# Patient Record
Sex: MALE
[De-identification: names, ages, dates, MRNs, and addresses within clinical notes are randomized per-mention and may not be internally consistent; named-entity substitution may affect disease eponyms.]

---

## 2023-05-23 ENCOUNTER — APPOINTMENT (OUTPATIENT)
Facility: HOSPITAL | Age: 23
End: 2023-05-23
Payer: MEDICAID

## 2023-05-23 ENCOUNTER — HOSPITAL ENCOUNTER (EMERGENCY)
Facility: HOSPITAL | Age: 23
Discharge: HOME | End: 2023-05-23
Attending: STUDENT IN AN ORGANIZED HEALTH CARE EDUCATION/TRAINING PROGRAM
Payer: MEDICAID

## 2023-05-23 VITALS
HEIGHT: 68 IN | SYSTOLIC BLOOD PRESSURE: 134 MMHG | DIASTOLIC BLOOD PRESSURE: 108 MMHG | TEMPERATURE: 98.6 F | HEART RATE: 65 BPM | RESPIRATION RATE: 18 BRPM | WEIGHT: 142.42 LBS | OXYGEN SATURATION: 98 % | BODY MASS INDEX: 21.58 KG/M2

## 2023-05-23 DIAGNOSIS — S06.0XAA CONCUSSION WITH UNKNOWN LOSS OF CONSCIOUSNESS STATUS, INITIAL ENCOUNTER: ICD-10-CM

## 2023-05-23 DIAGNOSIS — S09.90XA INJURY OF HEAD, INITIAL ENCOUNTER: ICD-10-CM
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
## 2023-05-23 PROCEDURE — 99284 EMERGENCY DEPT VISIT MOD MDM: CPT

## 2023-05-23 PROCEDURE — 70480 CT ORBIT/EAR/FOSSA W/O DYE: CPT

## 2023-05-23 PROCEDURE — 70450 CT HEAD/BRAIN W/O DYE: CPT

## 2023-05-23 PROCEDURE — 72125 CT NECK SPINE W/O DYE: CPT

## 2023-05-23 ASSESSMENT — PAIN DESCRIPTION - LOCATION: LOCATION: EYE

## 2023-05-23 ASSESSMENT — PAIN SCALES - GENERAL: PAINLEVEL_OUTOF10: 5

## 2023-05-24 NOTE — ED NOTES
at bedside. Holding for Bloominous.       Juanita Pedro [facility]  05/23/23 6404
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
## 2023-05-24 NOTE — ED NOTES
Discharge info and education provided to Patient with  at bedside. All questions answered.       [facility]  05/23/23 0695

## 2023-05-24 NOTE — DISCHARGE INSTRUCTIONS
Please begin icing the area around your eye, you can consider taking 2 Tylenol (650 mg) every 6 hours at home and consider using 3 ibuprofen (600 mg) OTC every 6 hours in addition for pain    Please follow-up with primary care doctor within 1 week for reevaluation and return to ER if you have any worsening symptoms.